# Patient Record
Sex: MALE
[De-identification: names, ages, dates, MRNs, and addresses within clinical notes are randomized per-mention and may not be internally consistent; named-entity substitution may affect disease eponyms.]

---

## 2020-05-27 ENCOUNTER — HOSPITAL ENCOUNTER (EMERGENCY)
Dept: HOSPITAL 75 - ER | Age: 30
Discharge: HOME | End: 2020-05-27
Payer: COMMERCIAL

## 2020-05-27 VITALS — SYSTOLIC BLOOD PRESSURE: 139 MMHG | DIASTOLIC BLOOD PRESSURE: 77 MMHG

## 2020-05-27 VITALS — HEIGHT: 67.72 IN | BODY MASS INDEX: 28.06 KG/M2 | WEIGHT: 182.98 LBS

## 2020-05-27 DIAGNOSIS — F17.290: ICD-10-CM

## 2020-05-27 DIAGNOSIS — Z20.828: ICD-10-CM

## 2020-05-27 DIAGNOSIS — R05: Primary | ICD-10-CM

## 2020-05-27 PROCEDURE — 87631 RESP VIRUS 3-5 TARGETS: CPT

## 2020-05-27 PROCEDURE — 99282 EMERGENCY DEPT VISIT SF MDM: CPT

## 2020-05-27 PROCEDURE — 87635 SARS-COV-2 COVID-19 AMP PRB: CPT

## 2020-05-27 PROCEDURE — 87798 DETECT AGENT NOS DNA AMP: CPT

## 2020-05-27 NOTE — ED COUGH/URI
General


Chief Complaint:  Cough/Cold/Flu Symptoms


Stated Complaint:  COUGH,HEADACHE


Nursing Triage Note:  


Pt states he developed a dry cough and HA yesterday; states he has been 


quarentined and has had no recent travel and no sick contacts. No other 


contributing sx.


Sepsis Screen:  No Definite Risk


Source:  patient





History of Present Illness


Date Seen by Provider:  May 27, 2020


Time Seen by Provider:  20:45


Initial Comments


PT ARRIVES VIA POV


C/O NON-PRODUCTIVE COUGH AND HEADACHE X 2 DAYS


NO FEVER


NO CHEST PAIN OR SHORTNESS OF BREATH


NO NAUSEA/VOMITING/DIARRHEA


NO HISTORY OF RESPIRATORY PROBLEMS





HAS NOT TAKEN ANY THING FOR SYMPTOMS





PT HAS NOT FOLLOWED STAY AT HOME ORDERS--PT IS STUDENT--WAS IN [place], [place] ( [facility] [place] X 6 EYARS) HAS RECENTLY 

TRAVELED AROUND AND JUST MOVED HERE TO [place] 1 WEEK AGO TO GO TO SCHOOL, 

WITH A FRIEND, WHO HAS DONE THE SAME





Allergies and Home Medications


Allergies


Coded Allergies:  


     No Known Drug Allergies (Unverified , 5/27/20)





Home Medications


Azithromycin 500 Mg Tablet, 500 MG PO DAILY


   Prescribed by: CHIOMA MCFARLANE on 5/27/20 2113


Guaifenesin/Dextromethorphan 1 Each Tbmp.12hr, 1 EACH PO BID


   Prescribed by: CHIOMA MCFARLANE on 5/27/20 2113





Patient Home Medication List


Home Medication List Reviewed:  Yes





Review of Systems


Review of Systems


Constitutional:  no symptoms reported; No chills, No dizziness, No fever, No 

malaise, No weakness


EENTM:  no symptoms reported; No nose congestion, No throat pain


Respiratory:  see HPI, cough; No phlegm, No short of breath, No wheezing


Cardiovascular:  no symptoms reported; No chest pain, No edema, No palpitations,

No syncope


Gastrointestinal:  no symptoms reported; No diarrhea, No loss of appetite, No 

nausea, No vomiting


Genitourinary:  no symptoms reported


Musculoskeletal:  no symptoms reported


Skin:  no symptoms reported


Psychiatric/Neurological:  See HPI, Headache; Denies Numbness, Denies 

Paresthesia, Denies Seizure, Denies Tingling, Denies Weakness





Past Medical-Social-Family Hx


Patient Social History


Alcohol Use:  Occasionally Uses


Recreational Drug Use:  No


Smoking Status:  Current Someday Smoker


Type Used:  Hookah


2nd Hand Smoke Exposure:  Yes


Recent Foreign Travel:  No


Contact w/Someone Who Travel:  No


Recent Infectious Disease Expo:  No


Recent Hopitalizations:  No


Physical Abuse:  No


Sexual Abuse:  No


Mistreated:  No


Fear:  No





Seasonal Allergies


Seasonal Allergies:  No





Past Medical History


Surgeries:  No


Respiratory:  No


Cardiac:  No


Neurological:  No


Genitourinary:  No


Gastrointestinal:  No


Musculoskeletal:  No


Endocrine:  No


HEENT:  No


Cancer:  No


Psychosocial:  No


Integumentary:  No


Blood Disorders:  No





Physical Exam





Vital Signs - First Documented




















Capillary Refill : Less Than 3 Seconds


Height: '"


Weight: lbs. oz. kg; 28.00 BMI


Method:


General Appearance:  WD/WN, no apparent distress, other (OES NOT APPEAR ILL OR 

TO BE IN ANY DISCOMFORT OR DISTRESS. NO COUGH NOTED AT ANY TIME DURING INTIRE ER

STAY)


HEENT:  PERRL/EOMI, normal ENT inspection, TMs normal, pharynx normal


Neck:  non-tender, full range of motion, supple, normal inspection


Respiratory:  normal breath sounds, no accessory muscle use


Cardiovascular:  regular rate, rhythm, no murmur


Gastrointestinal:  non tender, soft


Extremities:  normal inspection, no pedal edema, normal capillary refill


Neurologic/Psychiatric:  CNs II-XII nml as tested, no motor/sensory deficits, 

alert, normal mood/affect, oriented x 3


Skin:  normal color (PT IS MIDDLE EASTERN), warm/dry; No rash





Progress/Results/Core Measures


Suspected Sepsis


Recent Fever Within 48 Hours:  No


Infection Criteria Present:  None


New/Unexplained  Altered Menta:  No


Sepsis Screen:  No Definite Risk


SIRS


Temperature: 


Pulse: 74 


Respiratory Rate: 16


 


Blood Pressure 139 /77 


Mean: 97





Results/Orders


My Orders





Orders - CHIOMA MCFARLANE DO


Coronavirus Sars-Cov-2 So 2019 (5/27/20 21:00)


Adenovirus Detection By Pcr (5/27/20 21:00)


Parainfluenza Virus 1,2,3 Pcr (5/27/20 21:00)





Vital Signs/I&O











 5/27/20 5/27/20





 20:33 20:33


 


Temp 37.0 


 


Pulse 74 


 


Resp 16 


 


B/P (MAP) 139/77 (97) 


 


Pulse Ox 98 


 


O2 Delivery Room Air Room Air





Capillary Refill : Less Than 3 Seconds








Blood Pressure Mean:                    97








Progress Note :  


Progress Note


COVID TESTING PERFORMED, AS PT REPORTED TO ME THAT HE HAS BEEN TRAVELING AND 

JUST MOVED HERE FROM [place] A WEEK AGO, WITH A FRIEND. PPE DONNED 

AT THAT TIME





Departure


Impression





   Primary Impression:  


   Cough


   Additional Impression:  


   COVID P.U.I.


Disposition:  01 HOME, SELF-CARE


Condition:  Stable





Departure-Patient Inst.


Referrals:  


NO,LOCAL PHYSICIAN (PCP/Family)


Primary Care Physician


Patient Instructions:  Coronavirus Disease 2019 (COVID-19) (DC), Cough, Adult 

(DC)





Add. Discharge Instructions:  


YOU AND ALL HOUSEHOLD MEMBERS NEED TO QUARANTINE FOR THE NEXT 2 WEEKS, OR UNTIL 

CLEARED BY HEALTH DEPARTMENT


NO ONE ENTERS OR LEAVES YOUR HOUSE FOR THE NEXT 2 WEEKS





TYLENOL AS NEEDED FOR PAIN OR FEVER





FOLLOW UP WITH  OF CHOICE IN 1 WEEK IF NO BETTER, RETURN TO ER IF SYMPTOMS WO

RSEN





All discharge instructions reviewed with patient and/or family. Voiced 

understanding.


Scripts


Guaifenesin/Dextromethorphan (Mucinex Dm ER 1,200-60 mg Tab) 1 Each Tbmp.12hr


1 EACH PO BID, #20 EA


   Prov: CHIOMA MCFARLANE DO         5/27/20 


Azithromycin (Zithromax) 500 Mg Tablet


500 MG PO DAILY, #5 TAB


   Prov: CHIOMA MCFARLANE DO         5/27/20


Work/School Note:  Local Medical Staff Listing











CHIOMA MCFARLANE DO                 May 27, 2020 21:14